# Patient Record
(demographics unavailable — no encounter records)

---

## 2025-03-10 NOTE — HISTORY OF PRESENT ILLNESS
[FreeTextEntry1] : BRANDT MADDEN is a 51-year-old female, with a PMHx significant for HTN, who presents today for cardiac evaluation. Patient complains of palpitations that occur daily. Otherwise: (-) chest pain, (-) dyspnea, (-) diaphoresis, (-) hemoptysis, (-) syncope, (-) travel.  Social History: (+) Former cigarette smoker x10 years - quit 2022, (+) EtOH.

## 2025-03-10 NOTE — DISCUSSION/SUMMARY
[FreeTextEntry1] : Palpitations: Recommend an MCOT monitor x1 week to evaluate for etiology of palpitations.  HTN: BP today is 140/90 mmHg. There are no changes in medication management. Continue Norvasc 5 mg. Other planned treatments include an exercise regimen, weight loss, low sodium diet, and alcohol moderation.  Overweight: Discussed risks of being overweight with the patient. Counselled on weight loss with dietary modification and increased physical activity/exercise.  Instructed to follow up after testing is complete.  Plan was discussed with the patient.

## 2025-03-20 NOTE — DISCUSSION/SUMMARY
[FreeTextEntry1] : Palpitations: MCOT without evidence of arrhythmias.  HTN: BP today is 136/86 mmHg. There are no changes in medication management. Continue Norvasc 5 mg. Other planned treatments include an exercise regimen, weight loss, low sodium diet, and alcohol moderation.  Overweight: Discussed risks of being overweight with the patient. Counselled on weight loss with dietary modification and increased physical activity/exercise.  Instructed to follow up PRN.  Plan was discussed with the patient.

## 2025-03-20 NOTE — HISTORY OF PRESENT ILLNESS
[FreeTextEntry1] : BRANDT MADDEN is a 51-year-old female, with a PMHx significant for HTN, who presents today for a follow-up visit. Patient was last seen on 3/7/2025 for evaluation of daily palpitations. MCOT without evidence of arrhythmias. Otherwise: (-) chest pain, (-) dyspnea, (-) diaphoresis, (-) hemoptysis, (-) syncope, (-) travel.  Social History: (+) Former cigarette smoker x10 years - quit 2022, (+) EtOH.

## 2025-04-24 NOTE — DISCUSSION/SUMMARY
[FreeTextEntry1] : HTN: Currently, the condition is mildly elevated. Increase Amlodipine 7.5mg QD. Other planned treatments include an exercise regimen, weight loss, low sodium diet, and alcohol moderation.  Overweight: Discussed risks of being overweight with the patient. Counselled on weight loss with dietary modification and increased physical activity/exercise.  Instructed to follow up in 6 months.  Plan was discussed with the patient.    I, Dr. Felton, personally performed the evaluation and management services for this patient. That E&M includes reviewing prior history/tests, conducting the medically appropriate examination and evaluation, assessing all conditions, establishing a plan of care, ordering medications, and counselling and educating the patient. I spent a total of 30 minutes on today's encounter evaluating and treating the patient.

## 2025-04-24 NOTE — HISTORY OF PRESENT ILLNESS
[FreeTextEntry1] : BRANDT MADDEN is a 51-year-old female, with a PMHx significant for HTN, who presents today for a follow-up visit. BP has been mildly elevated, 140/90 mmHg. Denies any other complaints at this time.   Social History: (+) Former cigarette smoker x10 years - quit 2022, (+) EtOH.